# Patient Record
Sex: FEMALE | Race: WHITE
[De-identification: names, ages, dates, MRNs, and addresses within clinical notes are randomized per-mention and may not be internally consistent; named-entity substitution may affect disease eponyms.]

---

## 2017-04-27 ENCOUNTER — HOSPITAL ENCOUNTER (OUTPATIENT)
Dept: HOSPITAL 10 - SDS | Age: 52
Discharge: HOME | End: 2017-04-27
Attending: ORTHOPAEDIC SURGERY
Payer: COMMERCIAL

## 2017-04-27 VITALS — SYSTOLIC BLOOD PRESSURE: 113 MMHG | HEART RATE: 78 BPM | RESPIRATION RATE: 24 BRPM | DIASTOLIC BLOOD PRESSURE: 87 MMHG

## 2017-04-27 VITALS — DIASTOLIC BLOOD PRESSURE: 64 MMHG | RESPIRATION RATE: 20 BRPM | HEART RATE: 67 BPM | SYSTOLIC BLOOD PRESSURE: 130 MMHG

## 2017-04-27 VITALS — DIASTOLIC BLOOD PRESSURE: 69 MMHG | RESPIRATION RATE: 13 BRPM | SYSTOLIC BLOOD PRESSURE: 108 MMHG | HEART RATE: 64 BPM

## 2017-04-27 VITALS — RESPIRATION RATE: 17 BRPM | SYSTOLIC BLOOD PRESSURE: 112 MMHG | DIASTOLIC BLOOD PRESSURE: 78 MMHG | HEART RATE: 64 BPM

## 2017-04-27 VITALS — DIASTOLIC BLOOD PRESSURE: 78 MMHG | RESPIRATION RATE: 13 BRPM | HEART RATE: 62 BPM | SYSTOLIC BLOOD PRESSURE: 120 MMHG

## 2017-04-27 VITALS — SYSTOLIC BLOOD PRESSURE: 110 MMHG | DIASTOLIC BLOOD PRESSURE: 75 MMHG | HEART RATE: 76 BPM | RESPIRATION RATE: 20 BRPM

## 2017-04-27 VITALS — RESPIRATION RATE: 13 BRPM | DIASTOLIC BLOOD PRESSURE: 69 MMHG | SYSTOLIC BLOOD PRESSURE: 111 MMHG | HEART RATE: 66 BPM

## 2017-04-27 VITALS — RESPIRATION RATE: 13 BRPM | DIASTOLIC BLOOD PRESSURE: 76 MMHG | HEART RATE: 70 BPM | SYSTOLIC BLOOD PRESSURE: 110 MMHG

## 2017-04-27 VITALS
WEIGHT: 156.53 LBS | BODY MASS INDEX: 27.73 KG/M2 | HEIGHT: 63 IN | BODY MASS INDEX: 27.73 KG/M2 | BODY MASS INDEX: 27.73 KG/M2 | WEIGHT: 156.53 LBS | HEIGHT: 63 IN

## 2017-04-27 VITALS — DIASTOLIC BLOOD PRESSURE: 78 MMHG | HEART RATE: 60 BPM | RESPIRATION RATE: 11 BRPM | SYSTOLIC BLOOD PRESSURE: 120 MMHG

## 2017-04-27 VITALS — DIASTOLIC BLOOD PRESSURE: 75 MMHG | SYSTOLIC BLOOD PRESSURE: 116 MMHG | HEART RATE: 62 BPM | RESPIRATION RATE: 15 BRPM

## 2017-04-27 VITALS — DIASTOLIC BLOOD PRESSURE: 76 MMHG | SYSTOLIC BLOOD PRESSURE: 117 MMHG | RESPIRATION RATE: 13 BRPM

## 2017-04-27 VITALS — SYSTOLIC BLOOD PRESSURE: 111 MMHG | DIASTOLIC BLOOD PRESSURE: 69 MMHG | HEART RATE: 65 BPM | RESPIRATION RATE: 16 BRPM

## 2017-04-27 DIAGNOSIS — E11.9: ICD-10-CM

## 2017-04-27 DIAGNOSIS — D48.1: Primary | ICD-10-CM

## 2017-04-27 DIAGNOSIS — E78.5: ICD-10-CM

## 2017-04-27 PROCEDURE — 26160 REMOVE TENDON SHEATH LESION: CPT

## 2017-04-27 PROCEDURE — 88305 TISSUE EXAM BY PATHOLOGIST: CPT

## 2017-04-27 PROCEDURE — 82962 GLUCOSE BLOOD TEST: CPT

## 2017-04-27 NOTE — OPR
DATE OF OPERATION:  04/27/2017

 

 

SURGEON:  NIKKI BURCIAGA MD.

 

ANESTHESIA:  Local MAC.

 

PREOPERATIVE DIAGNOSIS:  Right small finger mass.

 

POSTOPERATIVE DIAGNOSIS:  Right small finger mass.

 

PROCEDURE:  Excision of the deep mass from right small finger measuring 2 cm x 1 cm.

 

OPERATIVE FINDINGS:  Right small finger mass at the radial aspect of the middle phalanx adherent to 
the underlying extensor tendon with the appearance of giant cell tumor tendon sheath.

 

INDICATION FOR PROCEDURE: A 51-year-old female with longstanding mass over the right small finger wh
ich was increasing in size.  Patient elected for surgical intervention, understanding the risks, andreas
efits.

 

DESCRIPTION OF PROCEDURE:  The patient was seen in the preoperative area and all further questions w
ere answered.  Again, she gave informed consent understanding risks and benefits.  She was taken to 
operative suite and placed in supine position.  Sedation was administered and right upper extremity 
was prepped with ChloraPrep stick and draped in the usual sterile fashion.  Ancef 2 grams was admini
stered and the right small finger was elevated and an Esmarch, tourniquet was placed at the base of 
the finger secured by a curved hemostat.  A mid axial incision was utilized at the radial aspect of 
the small finger.  With sharp dissection, it was carried down through skin and subcutaneous tissue. 
 The mass which deep and adherent to the extensor tendon was left encapsulated and scissor dissectio
n around the mass revealed a 2 cm x 1 cm mass adherent to the underlying tendon.  Once the mass was 
identified circumferentially, it was elevated off of the tendon with a small portion of the tendon c
oming with it.  Visually, the mass was completely excised.  Wound was copiously irrigated and skin c
losed with 4-0 nylon.  The mass was sent for specimen and Xeroform placed over the wound followed by
 sterile gauze and 1 inch Coban dressing.  The patient was awakened from anesthesia and taken to the
 postoperative suite in stable condition and tolerated procedure well without complication.

 

SPECIMENS:  Right small finger mass.

 

ESTIMATED BLOOD LOSS:  5 mL.

 

COUNTS:  Sponge, instrument and needle counts correct.

 

TOURNIQUET TIME:  16 minutes.

 

CONDITION ON DISCHARGE:  Stable.

 

 

Dictated By: NIKKI COLES/EMMA

DD:    04/27/2017 17:39:05

DT:    04/27/2017 17:51:28

Conf#: [id number]

DID#:  [id number]

## 2017-04-27 NOTE — HPN
Date/Time of Note


Date/Time of Note


DATE: 4/27/17 


TIME: 16:42





Interval H&P Admission Note


Pt. seen H&P reviewed:  No system changes











NIKKI VENCES Apr 27, 2017 16:42

## 2017-09-19 ENCOUNTER — HOSPITAL ENCOUNTER (EMERGENCY)
Dept: HOSPITAL 10 - FTE | Age: 52
LOS: 1 days | Discharge: HOME | End: 2017-09-20
Payer: COMMERCIAL

## 2017-09-19 VITALS
WEIGHT: 160.94 LBS | BODY MASS INDEX: 28.52 KG/M2 | HEIGHT: 63 IN | HEIGHT: 63 IN | BODY MASS INDEX: 28.52 KG/M2 | WEIGHT: 160.94 LBS

## 2017-09-19 DIAGNOSIS — H66.93: Primary | ICD-10-CM

## 2017-09-19 DIAGNOSIS — E11.9: ICD-10-CM

## 2017-09-19 PROCEDURE — 99283 EMERGENCY DEPT VISIT LOW MDM: CPT

## 2017-09-20 VITALS
SYSTOLIC BLOOD PRESSURE: 121 MMHG | RESPIRATION RATE: 18 BRPM | TEMPERATURE: 98 F | HEART RATE: 72 BPM | DIASTOLIC BLOOD PRESSURE: 72 MMHG

## 2017-09-20 NOTE — ERD
ER Documentation


Chief Complaint


Date/Time


DATE: 9/20/17 


TIME: 01:11


Chief Complaint


both ear pain x 3 days





HPI


Patient is a 52-year-old female with a past medical history of hyperlipidemia 

and diabetes who presents emergency department as of bilateral ear pain.  

Patient states her pain started 3 days ago.  Patient states the pain is getting 

worse.  Patient reports trying OTC ear relief drops with no alleviation of 

symptoms.  Patient states her right ear pain is worse than left ear pain.  

Patient reports rhinorrhea mild throat irritation.  Patient denies any fevers.  

Patient denies any nausea, vomiting, chest pain, shortness of breath, abdominal 

pain.  Of note, patient is currently taking Bactrim for UTI.





ROS


All systems reviewed and are negative except as per history of present illness.





Medications


Home Meds


Active Scripts


Acetaminophen* (Tylophen*) 500 Mg Capsule, 1 CAP PO Q6H Y for PAIN AND OR 

ELEVATED TEMP, #20 CAP


   Prov:KETURAH MUHAMMAD-MARYAM         9/20/17


Amoxicillin* (Amoxicillin*) 500 Mg Cap, 500 MG PO BID for 10 Days, CAP


   Prov:KETURAH MUHAMMAD PA-C         9/20/17





Allergies


Allergies:  


Coded Allergies:  


     No Known Drug Allergies (Verified  Allergy, Mild, 4/27/17)





PMhx/Soc


History of Surgery:  No


Anesthesia Reaction:  No


Hx Neurological Disorder:  No


Hx Respiratory Disorders:  No


Hx Cardiac Disorders:  No


Hx Psychiatric Problems:  No


Hx Miscellaneous Medical Probl:  Yes (ANEMIA,DM)


Hx Alcohol Use:  No


Hx Substance Use:  No


Hx Tobacco Use:  No


Smoking Status:  Never smoker





Physical Exam


Vitals





Vital Signs








  Date Time  Temp Pulse Resp B/P Pulse Ox O2 Delivery O2 Flow Rate FiO2


 


9/19/17 22:54 98.3 74 20 126/72 98   








Physical Exam


GENERAL: Well-developed, well-nourished female. Appears in no acute distress. 


HEAD: Normocephalic, atraumatic. 


EYES: Pupils are equally reactive bilaterally. EOMs grossly intact. No 

conjunctival erythema. 


ENT: External ear without any masses or tenderness.  TM visualized bilaterally.

  Right tympanic membrane appears erythematous and bulging.  Left tympanic 

membrane appears slightly erythematous.  Nontender to palpation of bilateral 

mastoid processes. Nasal mucosa pink with no discharge. Turbinates normal. 

Oropharynx is pink without any tonsillar erythema or exudates.Sinuses non-

tender to palpation. 


NECK: Supple. No meningismus. Normal range of motion of the neck.


LUNG: Clear to auscultation bilaterally. No rhonchi, wheezing, rales or coarse 

breath sounds. 


HEART: Regular rate and rhythm. No murmurs, rubs or gallops.


EXTREMITIES: Equal pulses bilaterally. No peripheral clubbing, cyanosis or 

edema. No unilateral leg swelling.


NEUROLOGIC: Alert and oriented. Moving all four extremities without any 

difficulty. Normal speech. Steady gait. 


SKIN: Normal color. Warm and dry. No rashes or lesions.





Procedures/MDM


MEDICAL DECISION MAKING:


This is a 52-year-old female who presents with bilateral ear pain, rhinorrhea 

and mild throat irritation.  Vital signs were reviewed. Patient was afebrile. 

Patient was not hypoxic. Ear exam findings were consistent with acute otitis 

media.  I have a much lower clinical suspicion for tympanic membrane perforation

, mastoiditis, otic barotrauma, TMJ dysfunction, cerumen impaction, meningitis, 

strep pharyngitis.





PRESCRIPTIONS:


Amoxicillin, Tylenol





DISCHARGE: 


At this time, patient is stable for discharge and outpatient management. I have 

instructed the patient to follow-up with his/her primary care physician in 1-2 

days. I have discussed with the patient the possibility of needing to see a 

specialist for further workup and diagnostic studies if the pain persists. I 

have instructed the patient to promptly return to the ER at any time for any 

new or worsening symptoms including increased pain, fever, swelling, discharge 

or hearing loss. The patient and/or family expressed understanding of and 

agreement with this plan. All questions were answered. Home care instructions 

were provided. 








Disclaimer: Inadvertent spelling and grammatical errors are likely due to EHR/

dictation software use and do not reflect on the overall quality of patient 

care. Also, please note that the electronic time recorded on this note does not 

necessarily reflect the actual time of the patient encounter.





Departure


Diagnosis:  


 Primary Impression:  


 Acute otitis media


 Otitis media type:  unspecified  Laterality:  unspecified laterality  

Qualified Code:  H66.90 - Acute otitis media, unspecified laterality, 

unspecified otitis media type


Condition:  Stable


Patient Instructions:  Otitis Media, Abx Tx (Adult)


Referrals:  


[facility]


YOU HAVE RECEIVED A MEDICAL SCREENING EXAM AND THE RESULTS INDICATE THAT YOU DO 

NOT HAVE A CONDITION THAT REQUIRES URGENT TREATMENT IN THE EMERGENCY DEPARTMENT.





FURTHER EVALUATION AND TREATMENT OF YOUR CONDITION CAN WAIT UNTIL YOU ARE SEEN 

IN YOUR DOCTORS OFFICE WITHIN THE NEXT 1-2 DAYS. IT IS YOUR RESPONSIBILITY TO 

MAKE AN APPOINTMENT FOR FOLOW-UP CARE.





IF YOU HAVE A PRIMARY DOCTOR


--you should call your primary doctor and schedule an appointment





IF YOU DO NOT HAVE A PRIMARY DOCTOR YOU CAN CALL OUR PHYSICIAN REFERRAL HOTLINE 

AT


 [phone] 





IF YOU CAN NOT AFFORD TO SEE A PHYSICIAN YOU CAN CHOSE FROM THE FOLLOWING 

[facility] [phone] [address] [facility]





[facility] [phone] [address] [facility] [phone] [address] [facility] [phone] [facility] [phone] [address] [facility]. [facility] [phone] [address] [facility]. [facility]


YOU HAVE RECEIVED A MEDICAL SCREENING EXAM AND THE RESULTS INDICATE THAT YOU DO 

NOT HAVE A CONDITION THAT REQUIRES URGENT TREATMENT IN THE EMERGENCY DEPARTMENT.





FURTHER EVALUATION AND TREATMENT OF YOUR CONDITION CAN WAIT UNTIL YOU ARE SEEN 

IN YOUR DOCTORS OFFICE WITHIN THE NEXT 1-2 DAYS. IT IS YOUR RESPONSIBILITY TO 

MAKE AN APPOINTMENT FOR FOLOW-UP CARE.





IF YOU HAVE A PRIMARY DOCTOR


--you should call your primary doctor and schedule and appointment





IF YOU DO NOT HAVE A PRIMARY DOCTOR YOU CAN CALL OUR PHYSICIAN REFERRAL HOTLINE 

AT [phone].





IF YOU CAN NOT AFFORD TO SEE A PHYSICIAN YOU CAN CHOSE FROM THE FOLLOWING 

[facility]:





[facility]


[address] [place] [address]





[facility]


[address] [place] [address]





[facility]


[address] [place] [address]





Additional Instructions:  


Call your primary care doctor TOMORROW for an appointment during the next 1-2 

days.See the doctor sooner or return here if your condition worsens before your 

appointment time.











KETURAH MUHAMMAD PA-C Sep 20, 2017 01:14

## 2018-08-18 ENCOUNTER — HOSPITAL ENCOUNTER (EMERGENCY)
Dept: HOSPITAL 91 - E/R | Age: 53
LOS: 1 days | Discharge: HOME | End: 2018-08-19
Payer: COMMERCIAL

## 2018-08-18 ENCOUNTER — HOSPITAL ENCOUNTER (EMERGENCY)
Age: 53
LOS: 1 days | Discharge: HOME | End: 2018-08-19

## 2018-08-18 DIAGNOSIS — E11.9: ICD-10-CM

## 2018-08-18 DIAGNOSIS — G44.209: Primary | ICD-10-CM

## 2018-08-18 PROCEDURE — 96375 TX/PRO/DX INJ NEW DRUG ADDON: CPT

## 2018-08-18 PROCEDURE — 81025 URINE PREGNANCY TEST: CPT

## 2018-08-18 PROCEDURE — 70450 CT HEAD/BRAIN W/O DYE: CPT

## 2018-08-18 PROCEDURE — 99285 EMERGENCY DEPT VISIT HI MDM: CPT

## 2018-08-18 PROCEDURE — 96374 THER/PROPH/DIAG INJ IV PUSH: CPT

## 2018-08-18 RX ADMIN — METOCLOPRAMIDE HYDROCHLORIDE 1 MG: 10 INJECTION, SOLUTION INTRAMUSCULAR; INTRAVENOUS at 23:02

## 2018-08-18 RX ADMIN — THIAMINE HYDROCHLORIDE 1 MLS/HR: 100 INJECTION, SOLUTION INTRAMUSCULAR; INTRAVENOUS at 23:03

## 2018-08-18 RX ADMIN — DIPHENHYDRAMINE HYDROCHLORIDE 1 MG: 50 INJECTION, SOLUTION INTRAMUSCULAR; INTRAVENOUS at 23:02

## 2018-08-18 RX ADMIN — ACETAMINOPHEN 1 MG: 500 TABLET, FILM COATED ORAL at 23:03

## 2018-08-19 RX ADMIN — KETOROLAC TROMETHAMINE 1 MG: 15 INJECTION, SOLUTION INTRAMUSCULAR; INTRAVENOUS at 00:47

## 2018-12-05 ENCOUNTER — HOSPITAL ENCOUNTER (EMERGENCY)
Dept: HOSPITAL 91 - FTE | Age: 53
Discharge: HOME | End: 2018-12-05
Payer: COMMERCIAL

## 2018-12-05 ENCOUNTER — HOSPITAL ENCOUNTER (EMERGENCY)
Age: 53
Discharge: HOME | End: 2018-12-05

## 2018-12-05 DIAGNOSIS — H92.02: Primary | ICD-10-CM

## 2018-12-05 DIAGNOSIS — E11.9: ICD-10-CM

## 2018-12-05 PROCEDURE — 99283 EMERGENCY DEPT VISIT LOW MDM: CPT
